# Patient Record
(demographics unavailable — no encounter records)

---

## 2024-12-03 NOTE — REVIEW OF SYSTEMS
[Fatigue] : fatigue [Snoring] : snoring [Postnasal Drip] : postnasal drip [Witnessed Apneas] : witnessed apnea [A.M. Dry Mouth] : a.m. dry mouth [Obesity] : obesity [Thyroid Disease] : thyroid disease [Anemia] : anemia [A.M. Headache] : headache present upon awakening [Heartburn] : heartburn [Difficulty Initiating Sleep] : difficulty falling asleep [Negative] : Psychiatric [Lower Extremity Discomfort] : no lower extremity discomfort [FreeTextEntry7] : on PPI [de-identified] : Uses Ambien prn.

## 2024-12-03 NOTE — ASSESSMENT
[FreeTextEntry1] :  #1. Lung function testing and BD therapy per Dr. Mcnulty. #2. S/p Covid infection and vaccines. #3. Diet and exercise for weight loss. #4. SOBOE is likely at least somewhat related to weight or deconditioning. #5. 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 #6. Replace PAP equipment as needed; ordered 12/3/24. #7. F/u in 8 weeks with compliance on new machine and with Dr. Mcnulty as previously scheduled.   The patient expressed understanding and agreement with the above recommendations/plan and accepts responsibility to be compliant with recommended testing, therapies, and f/u visits. All relevant questions and concerns were addressed.

## 2024-12-03 NOTE — HISTORY OF PRESENT ILLNESS
[Obstructive Sleep Apnea] : obstructive sleep apnea [Snoring] : snoring [Witnessed Apneas] : witnessed sleep apnea [Frequent Nocturnal Awakening] : frequent nocturnal awakening [Awakes Unrefreshed] : awakening unrefreshed [Awakes with Headache] : headache upon awakening [Awakening With Dry Mouth] : awakening with dry mouth [Date: ___] : Date of most recent diagnostic polysomnogram: [unfilled] [AHI: ___ per hour] : Apnea-hypopnea index:  [unfilled] per hour [T90%: ___] : T90%: [unfilled]% [FreeTextEntry1] : Former smoker of up to 2 ppd x 14 years, quit 1980. S/p Covid infection in 2020 with mild symptoms. Follows with [facility] [place] for breast cancer. Follows with Dr. Mcnulty for pulmonary issues. [Unintentional Sleep While Inactive] : no unintentional sleep while inactive [ESS] : 6

## 2024-12-03 NOTE — PHYSICAL EXAM
[General Appearance - Well Developed] : well developed [General Appearance - Well Nourished] : well nourished [Normal Conjunctiva] : the conjunctiva exhibited no abnormalities [Low Lying Soft Palate] : low lying soft palate [Elongated Uvula] : elongated uvula [Enlarged Base of the Tongue] : enlargement of the base of the tongue [III] : III [Neck Appearance] : the appearance of the neck was normal [Heart Rate And Rhythm] : heart rate was normal and rhythm regular [Heart Sounds] : normal S1 and S2 [Murmurs] : no murmurs [] : no respiratory distress [Respiration, Rhythm And Depth] : normal respiratory rhythm and effort [Exaggerated Use Of Accessory Muscles For Inspiration] : no accessory muscle use [Auscultation Breath Sounds / Voice Sounds] : lungs were clear to auscultation bilaterally [Nail Clubbing] : no clubbing of the fingernails [Cyanosis, Localized] : no localized cyanosis [No Focal Deficits] : no focal deficits [Oriented To Time, Place, And Person] : oriented to person, place, and time

## 2024-12-03 NOTE — CONSULT LETTER
[Dear  ___] : Dear  [unfilled], [Consult Letter:] : I had the pleasure of evaluating your patient, [unfilled]. [Please see my note below.] : Please see my note below. [Consult Closing:] : Thank you very much for allowing me to participate in the care of this patient.  If you have any questions, please do not hesitate to contact me. [Sincerely,] : Sincerely, [FreeTextEntry3] : with compliance

## 2024-12-03 NOTE — END OF VISIT
[Time Spent: ___ minutes] : I have spent [unfilled] minutes of time on the encounter which excludes teaching and separately reported services. [TextEntry] : Discussed with pt at length regarding CHET, obesity, poor sleep; reviewed w/u with pt as above.

## 2025-02-04 NOTE — RESULTS/DATA
[TextEntry] : PSG from 9/30/24 revealed severe CHET with an AHI of 69.2. The patient's overall compliance is 100% with a >4hr compliance of 100% on autoCPAP with a median and max pressure of 5.8 and 11.8 cm of water, respectively with a residual AHI of 10.6 which is c/w mild CHET.

## 2025-02-04 NOTE — REVIEW OF SYSTEMS
[Fatigue] : fatigue [Snoring] : snoring [Postnasal Drip] : postnasal drip [Witnessed Apneas] : witnessed apnea [A.M. Dry Mouth] : a.m. dry mouth [Obesity] : obesity [Thyroid Disease] : thyroid disease [Anemia] : anemia [A.M. Headache] : headache present upon awakening [Heartburn] : heartburn [Difficulty Initiating Sleep] : difficulty falling asleep [Negative] : Psychiatric [Lower Extremity Discomfort] : no lower extremity discomfort [FreeTextEntry7] : on PPI [de-identified] : Uses Ambien prn.

## 2025-02-04 NOTE — ASSESSMENT
[FreeTextEntry1] :  #1. Lung function testing and BD therapy per Dr. Mcnulty. #2. S/p Covid infection and vaccines. #3. Diet and exercise for weight loss. #4. SOBOE is likely at least somewhat related to weight or deconditioning. #5. Continue autoCPAP to treat severe CHET with an AHI of 69.2; encouraged at least 70% compliance. The patient is using and benefitting from CPAP therapy. #6. Replace PAP equipment as needed; ordered machine 12/3/24 and supplies with pressure change on 2/4/25. #7. F/u in 8 weeks with compliance on new pressures and with Dr. Mcnulty as previously scheduled.   The patient expressed understanding and agreement with the above recommendations/plan and accepts responsibility to be compliant with recommended testing, therapies, and f/u visits. All relevant questions and concerns were addressed.

## 2025-02-04 NOTE — HISTORY OF PRESENT ILLNESS
[Obstructive Sleep Apnea] : obstructive sleep apnea [Snoring] : snoring [Witnessed Apneas] : witnessed sleep apnea [Frequent Nocturnal Awakening] : frequent nocturnal awakening [Awakes Unrefreshed] : awakening unrefreshed [Awakes with Headache] : headache upon awakening [Awakening With Dry Mouth] : awakening with dry mouth [Date: ___] : Date of most recent diagnostic polysomnogram: [unfilled] [AHI: ___ per hour] : Apnea-hypopnea index:  [unfilled] per hour [T90%: ___] : T90%: [unfilled]% [FreeTextEntry1] : Former smoker of up to 2 ppd x 14 years, quit 1980. S/p Covid infection in 2020 with mild symptoms. [place] [facility] [place]. Follows with Dr. Mcnulty for pulmonary issues. [Unintentional Sleep While Inactive] : no unintentional sleep while inactive [ESS] : 6

## 2025-02-04 NOTE — PHYSICAL EXAM
[General Appearance - Well Developed] : well developed [General Appearance - Well Nourished] : well nourished [Normal Conjunctiva] : the conjunctiva exhibited no abnormalities [Low Lying Soft Palate] : low lying soft palate [Elongated Uvula] : elongated uvula [Enlarged Base of the Tongue] : enlargement of the base of the tongue [III] : III [Heart Rate And Rhythm] : heart rate was normal and rhythm regular [Heart Sounds] : normal S1 and S2 [Murmurs] : no murmurs [Respiration, Rhythm And Depth] : normal respiratory rhythm and effort [Exaggerated Use Of Accessory Muscles For Inspiration] : no accessory muscle use [Auscultation Breath Sounds / Voice Sounds] : lungs were clear to auscultation bilaterally [Nail Clubbing] : no clubbing of the fingernails [Cyanosis, Localized] : no localized cyanosis [No Focal Deficits] : no focal deficits [Oriented To Time, Place, And Person] : oriented to person, place, and time [Neck Appearance] : the appearance of the neck was normal [] : the neck was supple

## 2025-02-18 NOTE — ASSESSMENT
[FreeTextEntry1] : ECG: Normal sinus rhythm at 54with voltage for LVH and nonspecific T wave abnormalities..  ECG obtained to assist in diagnosis and management of assessed problem(s).  Lab data /-----12/24/21--5/27/21---5/7/22--2/17/23--5/25/23--8/10/23--11/5/24 Chol--169-------152-------164------138-------138--------142--------135 HDL----63--------57--------52--------54---------51---------61----------47 LDL----81--------72--------79--------63---------62---------62-----------65 Tri----155-------158-------251------130-------169--------102---------150   Cardiac CTA 8/21/2024 Calcium score: 2 No significant CAD is seen.  Echocardiogram 7/18/2024  basal inferior septum and basal inferior wall are hypokinetic.  Ejection fraction is 55% Left atrium mildly dilated  Echo 1/13/21 EF 60-65% Mildly dilated left atrium Mild mitral regurgitation Mild aortic valve sclerosis with out stenosis Trace pulmonic valve regurgitation  Pharmacologic stress MIBI 12/7/2021: Anterior wall breast attenuation artifact. No evidence of ischemia Gated ejection fraction normal.  Home sleep study 9/30/2024 AHI 69 Xiang O2-- 73% Findings compatible with severe CHET.  Impression: 1.  Hypertension: Seems to require the metoprolol 50 twice daily in order to maintain adequacy of control.  2.  Hyperlipidemia: Well-controlled with rosuvastatin 10 mg a day.  3. Chronic dyspnea on exertion appears to be mostly related to underlying COPD.  Recent diagnosis of severe CHET    Echocardiography had demonstrated hypokinesis inferiorly though without any new EKG changes.    CTA shows no evidence of any obstructive disease.  Suspect this was a false positive, perhaps related to diaphragmatic interference.  4.  Spinal cord stimulator was well-tolerated and apparently is proving helpful to her pain syndrome.  Plan: 1.  Despite continued fatigue encouraged her to continue using CPAP.  The issues about a high residual AHI will need to be addressed with sleep medicine.  2. Continue statin therapy. Follow a low-fat, low carb diet.  Obtain repeat lipid panel.  3. Pt advised to exercise for at least 30 minutes most days of the week. Any cardiac symptoms such as chest pain, palpitations or new shortness of breath should be reported.  4.  Continuation of current antihypertensive regimen.   5.  If she continues to lose weight unintentionally, she will need to regroup with medicine and GI.  Laboratory data and clinical follow-up in 6 months or sooner if needed.

## 2025-02-18 NOTE — PHYSICAL EXAM
[Normal S1, S2] : normal S1, S2 [No Rub] : no rub [No Gallop] : no gallop [Murmur] : murmur [Normal] : alert and oriented, normal memory [de-identified] : 1/6 systolic

## 2025-02-18 NOTE — REASON FOR VISIT
[FreeTextEntry1] : GAYLE RONDON is a 75  year-old F presents here for cardiac follow-up.  Her medical hx is relevant for: 1. Hypertension.  2. COPD. 3. Hyperlipidemia.  4. Obesity.  5 spinal fusion 6. Hx of syncope in May 2022 7. MGUS syndrome 8. CHET - severe

## 2025-02-18 NOTE — PHYSICAL EXAM
[Normal S1, S2] : normal S1, S2 [No Rub] : no rub [No Gallop] : no gallop [Murmur] : murmur [Normal] : alert and oriented, normal memory [de-identified] : 1/6 systolic

## 2025-02-18 NOTE — REVIEW OF SYSTEMS
[Weight Loss (___ Lbs)] : [unfilled] ~Ulb weight loss [Negative] : Heme/Lymph [Weight Gain (___ Lbs)] : no recent weight gain [FreeTextEntry9] : chronic back pain

## 2025-02-18 NOTE — HISTORY OF PRESENT ILLNESS
[FreeTextEntry1] : Patient had a recent diagnosis of severe sleep apnea (9/30/2024 HSS with AHI 69 and roland O2 73%).  Now on CPAP but feels continued fatigability.  In May 2024, patient presented with dehydration therefore HCTZ was discontinued.   states that she hydrates very inadequately.    She developed headaches associate with increased blood pressure and Metoprolol was increased to 50 mg BID. Because of low reported blood pressures, she was reportedly titrated back to 25 mg once daily,  The patient became confused because she had multiple bottles she was on 25 mg and then went back on 50 mg twice daily.   Admits that her blood pressure has been high when taking just 25 mg and she is now taking metoprolol 50 mg twice daily  Denies any chest pain, palpitations, edema, orthopnea or dizziness. Has baseline dyspnea on exertion due to COPD which remains unchanged in over a year.   Back pain issues continue to dominate her health. States that she no longer requires follow[facility]  After recent echocardiogram showing some inferior wall hypokinesis, cardiac CT angiogram was obtained - NO CAD.

## 2025-02-18 NOTE — HISTORY OF PRESENT ILLNESS
[FreeTextEntry1] : Patient had a recent diagnosis of severe sleep apnea (9/30/2024 HSS with AHI 69 and roland O2 73%).  Now on CPAP but feels continued fatigability.  In May 2024, patient presented with dehydration therefore HCTZ was discontinued.   states that she hydrates very inadequately.    She developed headaches associate with increased blood pressure and Metoprolol was increased to 50 mg BID. Because of low reported blood pressures, she was reportedly titrated back to 25 mg once daily,  The patient became confused because she had multiple bottles she was on 25 mg and then went back on 50 mg twice daily.   Admits that her blood pressure has been high when taking just 25 mg and she is now taking metoprolol 50 mg twice daily  Denies any chest pain, palpitations, edema, orthopnea or dizziness. Has baseline dyspnea on exertion due to COPD which remains unchanged in over a year.   Back pain issues continue to dominate her health. States that she no longer requires follow-up at [facility] [place]  After recent echocardiogram showing some inferior wall hypokinesis, cardiac CT angiogram was obtained - NO CAD.

## 2025-06-23 NOTE — REASON FOR VISIT
[FreeTextEntry1] : GAYLE RONDON is a 75  year-old F presents here for cardiac follow-up regarding management of hypertension which had become uncontrolled..   Patient describes headaches for several weeks.   Initially had bilateral maxillary pain and was being treated for presumed sinus condition with Advil Cold and Sinus as well as some antibiotics.  The headache then became generalized and frontal and by 6/18, blood pressures reached over 200 systolic in conjunction with a headache and she went to the [facility] emergency room where she was evaluated and treated and released on 6/19/2025.  Blood pressures remained 170-180 and amlodipine 5 mg was added on Friday with persistence of elevation of blood pressures though headaches have improved.   Patient states compliance with her CPAP.   She has discontinued the Advil Cold and Sinus.   She is attempting to watch her sodium intake fairly carefully.   Her medical hx is relevant for: 1. Hypertension.  2. COPD. 3. Hyperlipidemia.  4. Obesity.  5 spinal fusion 6. Hx of syncope in May 2022 7. MGUS syndrome 8. CHET - severe

## 2025-06-23 NOTE — HISTORY OF PRESENT ILLNESS
[FreeTextEntry1] : Patient   Was diagnosed with severe sleep apnea (9/30/2024 HSS with AHI 69 and roland O2 73%).  Now on CPAP but feels continued fatigability.  In May 2024, patient presented with dehydration therefore HCTZ was discontinued.   states that she hydrates very inadequately.     she has in the past developed headaches associate with increased blood pressure and Metoprolol was increased to 50 mg BID. Because of low reported blood pressures, she was reportedly titrated back to 25 mg once daily,  The patient became confused because she had multiple bottles she was on 25 mg and then went back on 50 mg twice daily.   Admits that her blood pressure has been high when taking just 25 mg and she is now taking metoprolol 50 mg twice daily  Denies any chest pain, palpitations, edema, orthopnea or dizziness. Has baseline dyspnea on exertion due to COPD which remains unchanged in over a year.   Back pain issues continue to dominate her health. States that she no longer requires follow-up at [facility] [place]  After recent echocardiogram showing some inferior wall hypokinesis, cardiac CT angiogram was obtained - NO CAD.

## 2025-06-23 NOTE — ASSESSMENT
[FreeTextEntry1] :  ECG: Normal sinus rhythm at 63 with voltage for LVH and nonspecific T wave abnormalities.  ECG obtained to assist in diagnosis and management of assessed problem(s).  Lab data /-----12/24/21--5/27/21---5/7/22--2/17/23--5/25/23--8/10/23--11/5/24 Chol--169-------152-------164------138-------138--------142--------135 HDL----63--------57--------52--------54---------51---------61----------47 LDL----81--------72--------79--------63---------62---------62-----------65 Tri----155-------158-------251------130-------169--------102---------150   Cardiac CTA 8/21/2024 Calcium score: 2 No significant CAD is seen.  Echocardiogram 7/18/2024  basal inferior septum and basal inferior wall are hypokinetic.  Ejection fraction is 55% Left atrium mildly dilated  Echo 1/13/21 EF 60-65% Mildly dilated left atrium Mild mitral regurgitation Mild aortic valve sclerosis with out stenosis Trace pulmonic valve regurgitation  Pharmacologic stress MIBI 12/7/2021: Anterior wall breast attenuation artifact. No evidence of ischemia Gated ejection fraction normal.  Home sleep study 9/30/2024 AHI 69 Xiang O2-- 73% Findings compatible with severe CHET.  Impression: 1.  Hypertension:  recently uncontrolled.  Possibly multifactorial and due to sinusitis coupled by several weeks of use of an NSAID (Advil)  and decongestant.  as blood pressure has become higher, patient has also become a bit anxious about this which is probably further fueling the fire.  2.  Hyperlipidemia: Well-controlled with rosuvastatin 10 mg a day.  3. Chronic dyspnea on exertion appears to be mostly related to underlying COPD.  Recent diagnosis of severe CHET    Echocardiography had demonstrated hypokinesis inferiorly though without any new EKG changes.    CTA shows no evidence of any obstructive disease.  Suspect this was a false positive, perhaps related to diaphragmatic interference.  4.  Spinal cord stimulator was well-tolerated and apparently is proving helpful to her pain syndrome.  5.   Severe sleep apnea which is being treated with compliance with CPAP therapy.  Plan: 1.  Despite continued fatigue encouraged her to continue using CPAP.  The issues about a high residual AHI will need to be addressed with sleep medicine.  2. Continue statin therapy. Follow a low-fat, low carb diet.  Obtain repeat lipid panel.  3. Pt advised to exercise for at least 30 minutes most days of the week. Any cardiac symptoms such as chest pain, palpitations or new shortness of breath should be reported.  4. Will add hydrochlorothiazide 25 mg to the regimen.  In the past this has resulted in some degree of intravascular depletion but this need not be a long-term treatment.  Will continue amlodipine 5 mg daily in the interim and patient will contact us in 48 hours with her blood pressure  5.  If she continues to lose weight unintentionally, she will need to regroup with medicine and GI.   BMP should be obtained in about 2 weeks

## 2025-06-23 NOTE — PHYSICAL EXAM
[Normal S1, S2] : normal S1, S2 [No Rub] : no rub [No Gallop] : no gallop [Murmur] : murmur [Normal] : alert and oriented, normal memory [de-identified] : 1/6 systolic

## 2025-06-23 NOTE — REVIEW OF SYSTEMS
[Negative] : Heme/Lymph [Weight Gain (___ Lbs)] : [unfilled] ~Ulb weight gain [Weight Loss (___ Lbs)] : no recent weight loss [FreeTextEntry9] : chronic back pain

## 2025-07-17 NOTE — ASSESSMENT
[FreeTextEntry1] : ECG: Normal sinus rhythm at 61, no acute st-t changed   ECG obtained to assist in diagnosis and management of assessed problem(s).  Lab data ----12/24/21--5/27/21---5/7/22--2/17/23--5/25/23--8/10/23--11/5/24--6/6/25 Chol--169-------152-------164------138-------138--------142--------135----151 HDL----63--------57--------52--------54---------51---------61----------47-----56 LDL----81--------72--------79--------63---------62---------62-----------65-----68 Tri----155-------158-------251------130-------169--------102---------150---198 M2l-------------------------------------------------------------------------------------2.7   Cardiac CTA 8/21/2024 Calcium score: 2 No significant CAD is seen.  Echocardiogram 7/18/2024  basal inferior septum and basal inferior wall are hypokinetic.  Ejection fraction is 55% Left atrium mildly dilated  Echo 1/13/21 EF 60-65% Mildly dilated left atrium Mild mitral regurgitation Mild aortic valve sclerosis with out stenosis Trace pulmonic valve regurgitation  Pharmacologic stress MIBI 12/7/2021: Anterior wall breast attenuation artifact. No evidence of ischemia Gated ejection fraction normal.  Home sleep study 9/30/2024 AHI 69 Xiang O2-- 73% Findings compatible with severe CHET.  Impression: 1.  Hypertension:  recently uncontrolled.  Possibly multifactorial and due to sinusitis coupled by several weeks of use of an NSAID (Advil)  and decongestant.  as blood pressure has become higher, patient has also become a bit anxious about this which is probably further fueling the fire.  2.  Hyperlipidemia: Well-controlled with rosuvastatin 10 mg a day.  3. Chronic dyspnea on exertion appears to be mostly related to underlying COPD.  Recent diagnosis of severe CHET    Echocardiography had demonstrated hypokinesis inferiorly though without any new EKG changes.    CTA shows no evidence of any obstructive disease.  Suspect this was a false positive, perhaps related to diaphragmatic interference.  4.  Spinal cord stimulator was well-tolerated and apparently is proving helpful to her pain syndrome.  5.   Severe sleep apnea which is being treated with compliance with CPAP therapy.  Plan: 1.  HTN; continue amlodipine 5 mg daily, continue HCTZ 25 daily and continue to hydrate, continue Valsartan 320 daily, continue Toprol Xl 50 bid. BP runs 140/80 but suspect back pain is driving this. Will not add new medications at this time. Patient will be following up with pain management this week and she will monitor blood pressure at home. Suspect with better pain management blood pressure will improve. Patient reports recent BW done with pcp, we have reached out to pcp to get the BMP  2.HLD; Continue statin therapy. Follow a low-fat, low carb diet.  We reached out to PCP to get recent lipid panel   3. Pt advised to exercise for at least 30 minutes most days of the week. Any cardiac symptoms such as chest pain, palpitations or new shortness of breath should be reported.  4. Back pain; follow up with pain management on friday   5. CHET; encouraged her to continue using CPAP.  The issues about a high residual AHI will need to be addressed with sleep medicine, has appointment to follow up in August    Clinical follow up in 4 months

## 2025-07-17 NOTE — ADDENDUM
[FreeTextEntry1] : I addended my note on 7-17-25   b to include reesnnt blood work dated 8142, lipids well controlled, A1x slightly elevated at 5.7, patient to decrease simple sugars

## 2025-07-17 NOTE — PHYSICAL EXAM
[Normal S1, S2] : normal S1, S2 [No Rub] : no rub [No Gallop] : no gallop [Murmur] : murmur [Normal] : alert and oriented, normal memory [de-identified] : 1/6 systolic

## 2025-07-17 NOTE — PHYSICAL EXAM
[Normal S1, S2] : normal S1, S2 [No Rub] : no rub [No Gallop] : no gallop [Murmur] : murmur [Normal] : alert and oriented, normal memory [de-identified] : 1/6 systolic

## 2025-07-17 NOTE — HISTORY OF PRESENT ILLNESS
[FreeTextEntry1] : hospitalized at [facility] 7/4/25 for episode of back pain and syncope. Patient states that she has been dealing with severe back pain ( sp spinal fusion and spinal cord stimulator) for which she follows with pain management. She had stood up from sitting in the passenger seat of her 's car when she felt severe back pain. She states that she suddenly saw dots in front of her eyes and tried to sit back down when she lost consciousness. Her  told her that she was unresponsive for about 1 minute and a half. She did scrape her ankles against the pavement and sustained superficial abrasions. Her  was able to prop her up and she did not sustain head trauma. At [facility] she got pain medication. Reportedly, labs, ecg, cxr, ct brain and ct back all without significant findings  she has appt with pain management on friday of this week and will be getting an MRI to rule out any new spinal issues.   Her BP today is 140/80 and it has been the same way at home.  In May 2024, patient presented with dehydration therefore HCTZ was discontinued. It was restarted at last OV 6/23/25, as described above, for . Patient reports hydrating more now.    Patient   Was diagnosed with severe sleep apnea (9/30/2024 HSS with AHI 69 and roland O2 73%).  Now on CPAP. Denies any chest pain, palpitations, edema, orthopnea or dizziness. Has baseline dyspnea on exertion due to COPD which remains unchanged in over a year.   Back pain issues continue to dominate her health. States that she no longer requires follow-up at [facility] [place]

## 2025-07-17 NOTE — REASON FOR VISIT
[FreeTextEntry1] : GAYLE RONDON is a 75  year-old F presents here for cardiac follow-up after recent hospitalization at [facility] ED 7/4/25 for vasovagal syncope   She had another recent hospitalization at [facility] emergency room for headache and systolic blood pressure over 200 where she was evaluated and treated and released on 6/19/2025.  Blood pressures remained 170-180 and amlodipine 5 mg was added with persistence of elevation of blood pressures though headaches have improved. HCTZ was added at last OV and BPs have been 140/80s. Patient states compliance with her CPAP. She is dealing with severe back pain.      Her medical hx is relevant for: 1. Hypertension.  2. COPD. 3. Hyperlipidemia.  4. Obesity.  5 spinal fusion 6. Hx of syncope in May 2022 7. MGUS syndrome 8. CHET - severe

## 2025-07-17 NOTE — ADDENDUM
[FreeTextEntry1] : I addended my note on 7-17-25   b to include reesnnt blood work dated 3828, lipids well controlled, A1x slightly elevated at 5.7, patient to decrease simple sugars   none

## 2025-07-17 NOTE — REASON FOR VISIT
[FreeTextEntry1] : GAYLE RONDON is a 75  year-old F presents here for cardiac follow-up after recent hospitalization at [facility] ED 7/4/25 for vasovagal syncope   She had another recent hospitalization at [facility] emergency room for headache and systolic blood pressure over 200 where she was evaluated and treated and released on 6/19/2025.  Blood pressures remained 170-180 and amlodipine 5 mg was added with persistence of elevation of blood pressures though headaches have improved. HCTZ was added at last OV and BPs have been 140/80s. Patient states compliance with her CPAP. She is dealing with severe back pain.      Her medical hx is relevant for: 1. Hypertension.  2. COPD. 3. Hyperlipidemia.  4. Obesity.  5 spinal fusion 6. Hx of syncope in May 2022 7. MGUS syndrome 8. CHET - severe

## 2025-07-17 NOTE — ASSESSMENT
[FreeTextEntry1] : ECG: Normal sinus rhythm at 61, no acute st-t changed   ECG obtained to assist in diagnosis and management of assessed problem(s).  Lab data ----12/24/21--5/27/21---5/7/22--2/17/23--5/25/23--8/10/23--11/5/24--6/6/25 Chol--169-------152-------164------138-------138--------142--------135----151 HDL----63--------57--------52--------54---------51---------61----------47-----56 LDL----81--------72--------79--------63---------62---------62-----------65-----68 Tri----155-------158-------251------130-------169--------102---------150---198 O6l-------------------------------------------------------------------------------------3.7   Cardiac CTA 8/21/2024 Calcium score: 2 No significant CAD is seen.  Echocardiogram 7/18/2024  basal inferior septum and basal inferior wall are hypokinetic.  Ejection fraction is 55% Left atrium mildly dilated  Echo 1/13/21 EF 60-65% Mildly dilated left atrium Mild mitral regurgitation Mild aortic valve sclerosis with out stenosis Trace pulmonic valve regurgitation  Pharmacologic stress MIBI 12/7/2021: Anterior wall breast attenuation artifact. No evidence of ischemia Gated ejection fraction normal.  Home sleep study 9/30/2024 AHI 69 Xiang O2-- 73% Findings compatible with severe CHET.  Impression: 1.  Hypertension:  recently uncontrolled.  Possibly multifactorial and due to sinusitis coupled by several weeks of use of an NSAID (Advil)  and decongestant.  as blood pressure has become higher, patient has also become a bit anxious about this which is probably further fueling the fire.  2.  Hyperlipidemia: Well-controlled with rosuvastatin 10 mg a day.  3. Chronic dyspnea on exertion appears to be mostly related to underlying COPD.  Recent diagnosis of severe CHET    Echocardiography had demonstrated hypokinesis inferiorly though without any new EKG changes.    CTA shows no evidence of any obstructive disease.  Suspect this was a false positive, perhaps related to diaphragmatic interference.  4.  Spinal cord stimulator was well-tolerated and apparently is proving helpful to her pain syndrome.  5.   Severe sleep apnea which is being treated with compliance with CPAP therapy.  Plan: 1.  HTN; continue amlodipine 5 mg daily, continue HCTZ 25 daily and continue to hydrate, continue Valsartan 320 daily, continue Toprol Xl 50 bid. BP runs 140/80 but suspect back pain is driving this. Will not add new medications at this time. Patient will be following up with pain management this week and she will monitor blood pressure at home. Suspect with better pain management blood pressure will improve. Patient reports recent BW done with pcp, we have reached out to pcp to get the BMP  2.HLD; Continue statin therapy. Follow a low-fat, low carb diet.  We reached out to PCP to get recent lipid panel   3. Pt advised to exercise for at least 30 minutes most days of the week. Any cardiac symptoms such as chest pain, palpitations or new shortness of breath should be reported.  4. Back pain; follow up with pain management on friday   5. CHET; encouraged her to continue using CPAP.  The issues about a high residual AHI will need to be addressed with sleep medicine, has appointment to follow up in August    Clinical follow up in 4 months

## 2025-07-17 NOTE — HISTORY OF PRESENT ILLNESS
[FreeTextEntry1] : hospitalized at [facility] 7/4/25 for episode of back pain and syncope. Patient states that she has been dealing with severe back pain ( sp spinal fusion and spinal cord stimulator) for which she follows with pain management. She had stood up from sitting in the passenger seat of her 's car when she felt severe back pain. She states that she suddenly saw dots in front of her eyes and tried to sit back down when she lost consciousness. Her  told her that she was unresponsive for about 1 minute and a half. She did scrape her ankles against the pavement and sustained superficial abrasions. Her  was able to prop her up and she did not sustain head trauma. At [facility] she got pain medication. Reportedly, labs, ecg, cxr, ct brain and ct back all without significant findings  she has appt with pain management on friday of this week and will be getting an MRI to rule out any new spinal issues.   Her BP today is 140/80 and it has been the same way at home.  In May 2024, patient presented with dehydration therefore HCTZ was discontinued. It was restarted at last OV 6/23/25, as described above, for . Patient reports hydrating more now.    Patient   Was diagnosed with severe sleep apnea (9/30/2024 HSS with AHI 69 and roland O2 73%).  Now on CPAP. Denies any chest pain, palpitations, edema, orthopnea or dizziness. Has baseline dyspnea on exertion due to COPD which remains unchanged in over a year.   Back pain issues continue to dominate her health. States that [place] [facility]